# Patient Record
Sex: MALE | Race: WHITE | NOT HISPANIC OR LATINO | Employment: FULL TIME | URBAN - METROPOLITAN AREA
[De-identification: names, ages, dates, MRNs, and addresses within clinical notes are randomized per-mention and may not be internally consistent; named-entity substitution may affect disease eponyms.]

---

## 2023-09-20 PROBLEM — G47.33 OBSTRUCTIVE SLEEP APNEA SYNDROME: Status: ACTIVE | Noted: 2023-09-20

## 2023-09-20 RX ORDER — IBUPROFEN 200 MG
TABLET ORAL
COMMUNITY

## 2023-09-20 RX ORDER — OXYCODONE AND ACETAMINOPHEN 5; 325 MG/1; MG/1
TABLET ORAL
COMMUNITY

## 2023-09-20 RX ORDER — ACETAMINOPHEN 500 MG
TABLET ORAL
COMMUNITY

## 2023-10-06 ENCOUNTER — APPOINTMENT (OUTPATIENT)
Dept: OCCUPATIONAL THERAPY | Facility: CLINIC | Age: 23
End: 2023-10-06
Payer: COMMERCIAL

## 2023-10-11 ENCOUNTER — TREATMENT (OUTPATIENT)
Dept: OCCUPATIONAL THERAPY | Facility: CLINIC | Age: 23
End: 2023-10-11
Payer: COMMERCIAL

## 2023-10-11 DIAGNOSIS — S46.912D SHOULDER STRAIN, LEFT, SUBSEQUENT ENCOUNTER: Primary | ICD-10-CM

## 2023-10-11 PROCEDURE — 97110 THERAPEUTIC EXERCISES: CPT | Mod: GO

## 2023-10-11 NOTE — PROGRESS NOTES
"  Occupational Therapy    Occupational Therapy Treatment    Patient Name: Saman Hillman  MRN: [id number]  Today's Date: 10/11/2023    Plan    Continue with left shoulder PRE's.             Assessment Pt is progressing well. Demonstrated full AROM L shoulder. Fatigue demonstrated with higher level strengthening exercises.    Pain Scale: On a scale of 0 to 10, the patient rates the pain at 0.   Please identify location of pain: left shoulder.   Pain Quality: dull.             Insurance    Insurance reviewed   Visit number: 10  [facility]   Onset Date: 03/ 04/ 2023     DOS: 7/7/2023     Subjective    Patient reports:.   No pain right now. I feel like I'm doing ok but still can feel some pain with certain movements.     Home program performing as directed: Yes.      Objective    ROM/JointMobility:   (Range of Motion in degrees)   Shoulder:   (Key: \" P! \" Denotes Pain with Movement, \" * \" Indicates Gravity Resistant Otherwise Measurements are in Supine)   Flexion: L Active 170.   ABDuction: L Active 170.   External Rotation (ER) ABDuction: L Active 80 deg. ER   Hand up back: L5.     Strength:   Shoulders:   (Key: \"P!\" Denotes Pain with Movement)   External rotation at side 4/5 on left.   Shoulder internal rotation at side 4/5 on left.         Treatment  UBE x 6 min   ER x 15x3 with 2.5 lbs  PNF diagonals x 15x3 with 2.5 lbs  Prone T and Y x 15x2 with 3 lbs on swiss ball   90/90 ER x15x3 with  2.5lbs  BOSU planks CW and CCW  Push up on BOSU x 5x2  "

## 2023-10-18 ENCOUNTER — TREATMENT (OUTPATIENT)
Dept: OCCUPATIONAL THERAPY | Facility: CLINIC | Age: 23
End: 2023-10-18
Payer: COMMERCIAL

## 2023-10-18 DIAGNOSIS — S46.912D SHOULDER STRAIN, LEFT, SUBSEQUENT ENCOUNTER: Primary | ICD-10-CM

## 2023-10-18 PROCEDURE — 97110 THERAPEUTIC EXERCISES: CPT | Mod: GO

## 2023-10-18 ASSESSMENT — PAIN SCALES - GENERAL: PAINLEVEL_OUTOF10: 0 - NO PAIN

## 2023-10-18 ASSESSMENT — PAIN - FUNCTIONAL ASSESSMENT: PAIN_FUNCTIONAL_ASSESSMENT: 0-10

## 2023-10-18 NOTE — PROGRESS NOTES
Occupational Therapy Treatment         Name: Saman Hillman  MRN: [id number]  : 2000  Date: 10/18/23  Time Calculation  Start Time: 1400  Stop Time: 1445  Time Calculation (min): 45 min  Visit #[id number]   Onset Date: 2023     DOS: 2023    Assessment: Pt is progressing well. Demonstrated full AROM and stretngth  L shoulder. Recommend pt is able to return to work      Plan: Pt will follow  up with MD tomorrow.         Subjective It was a little sore after starting the strengthening exercises, but it's good now.   General:        Pain Assessment:  0/10    Objective    Shoulder strength is 5/5 all planes   ROM is WNL's all planes        Treatment  UBE x 6 min   ER x 15x3 with 2.5 lbs  PNF diagonals x 15x3 with red theraband   Prone T and Y x 15x2 with 3 lbs on swiss ball   90/90 ER x15x3 with red   BOSU planks CW and CCW  Push up on BOSU x 5x2

## 2023-10-19 ENCOUNTER — OFFICE VISIT (OUTPATIENT)
Dept: ORTHOPEDIC SURGERY | Facility: HOSPITAL | Age: 23
End: 2023-10-19
Payer: OTHER GOVERNMENT

## 2023-10-19 DIAGNOSIS — S46.912A STRAIN OF UPPER ARM, LEFT, INITIAL ENCOUNTER: Primary | ICD-10-CM

## 2023-10-19 PROCEDURE — 99213 OFFICE O/P EST LOW 20 MIN: CPT | Performed by: ORTHOPAEDIC SURGERY

## 2023-10-19 ASSESSMENT — PAIN SCALES - GENERAL: PAINLEVEL_OUTOF10: 2

## 2023-10-25 ENCOUNTER — TREATMENT (OUTPATIENT)
Dept: OCCUPATIONAL THERAPY | Facility: CLINIC | Age: 23
End: 2023-10-25
Payer: COMMERCIAL

## 2023-10-25 DIAGNOSIS — S46.912D SHOULDER STRAIN, LEFT, SUBSEQUENT ENCOUNTER: Primary | ICD-10-CM

## 2023-10-25 PROCEDURE — 97110 THERAPEUTIC EXERCISES: CPT | Mod: GO

## 2023-10-25 ASSESSMENT — ENCOUNTER SYMPTOMS
SHORTNESS OF BREATH: 0
WOUND: 0
TROUBLE SWALLOWING: 0
WHEEZING: 0
FATIGUE: 0
FEVER: 0
CHILLS: 0

## 2023-10-25 NOTE — PROGRESS NOTES
Occupational Therapy Treatment         Name: aSman Hillman  MRN: [id number]  : 2000  Date: 10/25/23  Time Calculation  Start Time: 1605  Stop Time: 1645  Time Calculation (min): 40 min  Visit #12  BWC   Onset Date: 2023     DOS: 2023    Assessment: Pt is progressing well. Pt was able to tolerate increased loads with no increase in pain.     Plan: Continue to work on strengthening        Subjective My L arm doesn't feel as strong as my right   General:        Pain Assessment:  0/10    Objective    Shoulder strength is 5/5 all planes   ROM is WNL's all planes        Treatment  UBE x 6 min   ER x 15x3 with 2.5 lbs  PNF diagonals x 15x3 with 5 lbs   Prone T and Y x 15x2 with 3 lbs on swiss ball   BOSU planks CW and CCW  TRX  row x 15 x 2   Row x 15x3 with 22 lbs   Lat pull down x 15x3 with 17 lbs

## 2023-10-25 NOTE — PROGRESS NOTES
Reason for Appointment  Slowly improving L shoulder pain       History of Present Illness  Patient is a 23 y.o. male here today for follow-up evaluation 3 and half months status post a left shoulder arthroscopic labral repair and biceps tenodesis.  He is continuing to work in therapy, he is up to about 6 pound weights in therapy but the arm still fatigues quickly.  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  He is not able to return back to work full duty at this point but would like to return back on light duty if possible.  Is important for him to maximize this arm and regain as much strength as possible to reduce his risk of injury.  No other changes in his past medical history, allergies, or medications.    History reviewed. No pertinent past medical history.    History reviewed. No pertinent surgical history.    Medication Documentation Review Audit       Reviewed by Mónica Garcia MA (Medical Assistant) on 10/19/23 at 1010      Medication Order Taking? Sig Documenting Provider Last Dose Status   acetaminophen (Tylenol Extra Strength) 500 mg tablet [id number]  2 tablet oral every six hours PRN pain Historical Provider, MD  Active   ibuprofen (AdviL) 200 mg tablet [id number]  3 tablet oral twice a day PRN pain Historical Provider, MD  Active   oxyCODONE-acetaminophen (Percocet) 5-325 mg tablet [id number]  1 tablet oral every six hours PRN pain Historical Provider, MD  Active                    Allergies   Allergen Reactions    Amoxicillin Hives and Unknown    Other Nausea And Vomiting     Seafood       Review of Systems   Constitutional:  Negative for chills, fatigue and fever.   HENT:  Negative for nosebleeds and trouble swallowing.    Respiratory:  Negative for shortness of breath and wheezing.    Cardiovascular:  Negative for chest pain and leg swelling.   Skin:  Negative for rash and wound.     Exam   On exam the left shoulder shows good active forward flexion over 150 degrees.   He has good cuff strength with resisted external rotation, maybe some mild weakness compared to the opposite side.  Deltoid is functional.  Mild tenderness over the anterior joint line.  Good pulses and sensation in the upper extremity.    Assessment   Left arm strain    Plan   He will continue to work in therapy on regaining strength and he is not able to return back to full duty yet at this point.  He would like to go back on light duty

## 2023-11-01 ENCOUNTER — TREATMENT (OUTPATIENT)
Dept: OCCUPATIONAL THERAPY | Facility: CLINIC | Age: 23
End: 2023-11-01
Payer: COMMERCIAL

## 2023-11-01 DIAGNOSIS — S46.912D SHOULDER STRAIN, LEFT, SUBSEQUENT ENCOUNTER: Primary | ICD-10-CM

## 2023-11-01 PROCEDURE — 97110 THERAPEUTIC EXERCISES: CPT | Mod: GO

## 2023-11-01 NOTE — PROGRESS NOTES
Occupational Therapy                                        Occupational Therapy Treatment         Name: Saman Hillman  MRN: [id number]  : 2000  Date: 23  Time Calculation  Start Time: 1515  Stop Time: 1600  Time Calculation (min): 45 min  Visit #13  BWC   Onset Date: 2023     DOS: 2023    Assessment: Pt is progressing well. Pt was able to tolerate increased loads with no increase in pain.     Plan: Continue to work on strengthening        Subjective When I lift heavy I can feel soreness   General:        Pain Assessment:  0/10    Objective    Shoulder strength is 5/5 all planes   ROM is WNL's all planes        Treatment  UBE x 6 min   ER x 15x3 with 2.5 lbs  PNF diagonals x 15x3 with 5 lbs   Prone T and Y x 15x2 with 3 lbs on swiss ball   Row x 15x3 with 22 lbs   Lat pull down x 15x3 with 17 lbs   Row with 90/90 ER x 15x3 with red theraband

## 2023-11-08 ENCOUNTER — TREATMENT (OUTPATIENT)
Dept: OCCUPATIONAL THERAPY | Facility: CLINIC | Age: 23
End: 2023-11-08
Payer: COMMERCIAL

## 2023-11-08 DIAGNOSIS — S46.912D SHOULDER STRAIN, LEFT, SUBSEQUENT ENCOUNTER: Primary | ICD-10-CM

## 2023-11-08 PROCEDURE — 97110 THERAPEUTIC EXERCISES: CPT | Mod: GO

## 2023-11-08 NOTE — PROGRESS NOTES
Occupational Therapy    Occupational Therapy                                        Occupational Therapy Treatment         Name: Saman Hillman  MRN: [id number]  : 2000  Date: 23  Time Calculation  Start Time: 1030  Stop Time: 1113  Time Calculation (min): 43 min  Visit #14  BWC   Onset Date: 2023     DOS: 2023    Assessment: Pt is progressing well. Continues to report some mild weakness posterior shoulder with overhead movements      Plan: Continue to work on strengthening as tolerated        Subjective It just feels weak when I lift overhead   General:        Pain Assessment:  1/10    Objective    Shoulder strength is 5/5 all planes   ROM is WNL's all planes        Treatment  UBE x 6 min   ER x 15x3 with 2.5 lbs  PNF diagonals x 15x3 with 5 lbs   Prone T and Y x 15x2 with 4 lbs on swiss ball   Row x 15x3 with 22 lbs   Lat pull down x 15x3 with 17 lbs   Row with 90/90 ER x 15x3 with red theraband   TRX bodyweight row x 15x3

## 2023-11-15 ENCOUNTER — TREATMENT (OUTPATIENT)
Dept: OCCUPATIONAL THERAPY | Facility: CLINIC | Age: 23
End: 2023-11-15
Payer: COMMERCIAL

## 2023-11-15 DIAGNOSIS — S46.912D SHOULDER STRAIN, LEFT, SUBSEQUENT ENCOUNTER: Primary | ICD-10-CM

## 2023-11-15 PROCEDURE — 97110 THERAPEUTIC EXERCISES: CPT | Mod: GO

## 2023-11-15 NOTE — PROGRESS NOTES
Occupational Therapy                                        Occupational Therapy Treatment          Name: Saman Hillman  MRN: [id number]  : 2000  Date: 11/15/23  Time Calculation  Start Time: 1347  Stop Time: 1428  Time Calculation (min): 41 min  Visit #15  BWC   Onset Date: 2023     DOS: 2023    Assessment: Pt is progressing well. Continues to increase lifting loads and tolerates well      Plan: Continue to work on strengthening as tolerated        Subjective      General:        Pain Assessment:  1/10    Objective    Shoulder strength is 5/5 all planes   ROM is WNL's all planes        Treatment  UBE x 6 min   Bodyblade x 3 min   Rebounder- chest pass with 9 lb medicine ball  ER x 15x3 with 2.5 lbs  [place] walk with 10 lb KB x 15 ft x 2   PNF diagonals x 15x3 with 7 lbs   Prone T and Y x 15x2 with 4 lbs on swiss ball   Row x 15x3 with 22 lbs   Lat pull down x 15x3 with 22 lbs   Row with 90/90 ER x 15x3 with red theraband   TRX bodyweight row x 15x3

## 2023-12-04 ENCOUNTER — OFFICE VISIT (OUTPATIENT)
Dept: ORTHOPEDIC SURGERY | Facility: CLINIC | Age: 23
End: 2023-12-04
Payer: OTHER GOVERNMENT

## 2023-12-04 DIAGNOSIS — S46.912A STRAIN OF UPPER ARM, LEFT, INITIAL ENCOUNTER: Primary | ICD-10-CM

## 2023-12-04 PROCEDURE — 99213 OFFICE O/P EST LOW 20 MIN: CPT | Performed by: ORTHOPAEDIC SURGERY

## 2023-12-04 ASSESSMENT — ENCOUNTER SYMPTOMS
SHORTNESS OF BREATH: 0
WOUND: 0
TROUBLE SWALLOWING: 0
WHEEZING: 0
CHILLS: 0
FEVER: 0
FATIGUE: 0

## 2023-12-04 NOTE — PROGRESS NOTES
Reason for Appointment  Increased L shoulder pain     History of Present Illness  Patient is a 23 y.o. male here today for a [facility] case follow-up evaluation of continued left shoulder pain.  He is almost 5 months status post left shoulder arthroscopic labral repair and biceps tenodesis. 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  He is still working in therapy but feels like he has plateaued in terms of his progress.  The arm fatigues very quickly and we will need to modify his restrictions at this point to reduce his risk of reinjury.  No other changes in his past medical history, allergies, or medications.    History reviewed. No pertinent past medical history.    History reviewed. No pertinent surgical history.    Medication Documentation Review Audit       Reviewed by Aysha Garrison PA-C (Physician Assistant) on 12/04/23 at 1013      Medication Order Taking? Sig Documenting Provider Last Dose Status   acetaminophen (Tylenol Extra Strength) 500 mg tablet [id number] Yes 2 tablet oral every six hours PRN pain Historical Provider, MD Taking Active   ibuprofen (AdviL) 200 mg tablet [id number] Yes 3 tablet oral twice a day PRN pain Historical Provider, MD Taking Active   oxyCODONE-acetaminophen (Percocet) 5-325 mg tablet [id number] Yes 1 tablet oral every six hours PRN pain Historical Provider, MD Taking Active                    Allergies   Allergen Reactions    Amoxicillin Hives and Unknown    Other Nausea And Vomiting     Seafood       Review of Systems   Constitutional:  Negative for chills, fatigue and fever.   HENT:  Negative for hearing loss and trouble swallowing.    Respiratory:  Negative for shortness of breath and wheezing.    Cardiovascular:  Negative for chest pain and leg swelling.   Skin:  Negative for rash and wound.     Exam   On exam left shoulder shows good active forward flexion, no gross instability of the shoulder. Good cuff  strength with resisted external rotation, deltoid is functional.  Again, mild joint line tenderness.  Good pulses and sensation in the upper extremity.    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  It is important for him not to for work this arm to reduce his risk of reinjury.  We will also place a C9 for work hardening to increase strength and endurance in that arm.  We will follow-up with him in 6 weeks.    Written by Aysha Garrison PA-C

## 2023-12-07 ENCOUNTER — APPOINTMENT (OUTPATIENT)
Dept: ORTHOPEDIC SURGERY | Facility: HOSPITAL | Age: 23
End: 2023-12-07
Payer: COMMERCIAL

## 2023-12-21 ENCOUNTER — OFFICE VISIT (OUTPATIENT)
Dept: ORTHOPEDIC SURGERY | Facility: HOSPITAL | Age: 23
End: 2023-12-21
Payer: OTHER GOVERNMENT

## 2023-12-21 DIAGNOSIS — S46.912A STRAIN OF UPPER ARM, LEFT, INITIAL ENCOUNTER: Primary | ICD-10-CM

## 2023-12-21 PROCEDURE — 99213 OFFICE O/P EST LOW 20 MIN: CPT | Performed by: ORTHOPAEDIC SURGERY

## 2023-12-21 ASSESSMENT — PAIN SCALES - GENERAL: PAINLEVEL_OUTOF10: 0 - NO PAIN

## 2023-12-21 ASSESSMENT — PAIN - FUNCTIONAL ASSESSMENT: PAIN_FUNCTIONAL_ASSESSMENT: 0-10

## 2023-12-22 ASSESSMENT — ENCOUNTER SYMPTOMS
FEVER: 0
TROUBLE SWALLOWING: 0
COLOR CHANGE: 0
SHORTNESS OF BREATH: 0
WHEEZING: 0
CHILLS: 0
FATIGUE: 0

## 2023-12-22 NOTE — PROGRESS NOTES
Reason for Appointment  Slowly improving left shoulder pain    History of Present Illness  Patient is a 23 y.o. male here today for a [facility] case follow-up evaluation of slowly improving left shoulder pain.  He is 5 months out status post a left shoulder arthroscopic labral pair and biceps tenodesis.  Endurance and strength in that arm is slowly returning.  Has been recently approved for work conditioning and will start that after the first of the year.  He is currently working with restrictions.  No other changes in his past medical history, allergies, or medications.    History reviewed. No pertinent past medical history.    History reviewed. No pertinent surgical history.    Medication Documentation Review Audit       Reviewed by Mónica Garcia MA (Medical Assistant) on 12/21/23 at 1448      Medication Order Taking? Sig Documenting Provider Last Dose Status   acetaminophen (Tylenol Extra Strength) 500 mg tablet [id number] Yes 2 tablet oral every six hours PRN pain Historical Provider, MD Taking Active   ibuprofen (AdviL) 200 mg tablet [id number] Yes 3 tablet oral twice a day PRN pain Historical Provider, MD Taking Active   oxyCODONE-acetaminophen (Percocet) 5-325 mg tablet [id number] Yes 1 tablet oral every six hours PRN pain Historical Provider, MD Taking Active                    Allergies   Allergen Reactions    Amoxicillin Hives and Unknown    Other Nausea And Vomiting     Seafood       Review of Systems   Constitutional:  Negative for chills, fatigue and fever.   HENT:  Negative for nosebleeds and trouble swallowing.    Respiratory:  Negative for shortness of breath and wheezing.    Cardiovascular:  Negative for chest pain and leg swelling.   Skin:  Negative for color change and pallor.     Exam   On exam the left shoulder shows good active forward flexion over 150 degrees.  No stiffness with internal and external rotation.  Mildly positive impingement signs on the left.  Deltoid is functional.  Good pulses  and sensation in the upper extremity.    Assessment   Left shoulder strain    Plan   He will begin work conditioning after the beginning of the year.  This is a needed to regain endurance and strength back in that arm to reduce his risk of reinjury as he must go back to a very physical job as a . We will keep his current restrictions for the next 2 months and reassess him at that point to change his restrictions.    Written by Aysha Garrison PA-C

## 2024-02-06 DIAGNOSIS — S46.912D SHOULDER STRAIN, LEFT, SUBSEQUENT ENCOUNTER: Primary | ICD-10-CM

## 2024-02-19 ENCOUNTER — OFFICE VISIT (OUTPATIENT)
Dept: ORTHOPEDIC SURGERY | Facility: CLINIC | Age: 24
End: 2024-02-19
Payer: OTHER GOVERNMENT

## 2024-02-19 DIAGNOSIS — S46.912A STRAIN OF UPPER ARM, LEFT, INITIAL ENCOUNTER: Primary | ICD-10-CM

## 2024-02-19 PROCEDURE — 99213 OFFICE O/P EST LOW 20 MIN: CPT | Performed by: ORTHOPAEDIC SURGERY

## 2024-02-19 ASSESSMENT — PAIN SCALES - GENERAL: PAINLEVEL_OUTOF10: 0 - NO PAIN

## 2024-02-19 ASSESSMENT — ENCOUNTER SYMPTOMS
SHORTNESS OF BREATH: 0
FATIGUE: 0
WHEEZING: 0
FEVER: 0
ARTHRALGIAS: 1
CHILLS: 0

## 2024-02-19 ASSESSMENT — PAIN - FUNCTIONAL ASSESSMENT: PAIN_FUNCTIONAL_ASSESSMENT: 0-10

## 2024-02-19 NOTE — PROGRESS NOTES
Reason for Appointment  Chief Complaint   Patient presents with    Left Shoulder - Follow-up     History of Present Illness  DOL patient is a 23 y.o. male here today for follow-up evaluation of his left shoulder. He is about 7 months s/p left shoulder labral repair and biceps tenodesis. He has been to two work conditioning sessions. He would like to start working more. He did notice that his arm went numb while walking around with a heavy tool kit. No other updates to PMH, allergies, or medications.     History reviewed. No pertinent past medical history.    History reviewed. No pertinent surgical history.    Medication Documentation Review Audit       Reviewed by Mónica Garcia MA (Medical Assistant) on 02/19/24 at 1524      Medication Order Taking? Sig Documenting Provider Last Dose Status   acetaminophen (Tylenol Extra Strength) 500 mg tablet [id number] Yes 2 tablet oral every six hours PRN pain Historical Provider, MD Taking Active   ibuprofen (AdviL) 200 mg tablet [id number] Yes 3 tablet oral twice a day PRN pain Historical Provider, MD Taking Active   oxyCODONE-acetaminophen (Percocet) 5-325 mg tablet [id number] Yes 1 tablet oral every six hours PRN pain Historical Provider, MD Taking Active                    Allergies   Allergen Reactions    Amoxicillin Hives and Unknown    Other Nausea And Vomiting     Seafood       Review of Systems   Constitutional:  Negative for chills, fatigue and fever.   Respiratory:  Negative for shortness of breath and wheezing.    Cardiovascular:  Negative for chest pain and leg swelling.   Musculoskeletal:  Positive for arthralgias.   All other systems reviewed and are negative.      Exam   On exam of the left arm, there is excellent ROM with minimal loss of forward flexion, good internal and external rotation, good cuff strength, good biceps strength,     Assessment   Encounter Diagnosis   Name Primary?    Strain of upper arm, left, initial encounter Yes       Plan   He has been  doing well enough that we can stop work conditioning and he can return to work under increased restrictions.  The best work conditioning for him will be increased walking on his job. Follow-up in 2 months.       I, Koki Quinones, attest that this documentation has been prepared under the direction and in the presence of Diomedes Aldridge MD. By signing below, I, Diomedes Aldridge MD, personally performed the services described in this documentation. All medical record entries made by the scribe were at my direction and in my presence. I have reviewed the chart and agree that the record reflects my personal performance and is accurate and complete. 02/19/24

## 2024-03-18 ENCOUNTER — OFFICE VISIT (OUTPATIENT)
Dept: ORTHOPEDIC SURGERY | Facility: CLINIC | Age: 24
End: 2024-03-18
Payer: OTHER GOVERNMENT

## 2024-03-18 DIAGNOSIS — S46.912A STRAIN OF UPPER ARM, LEFT, INITIAL ENCOUNTER: Primary | ICD-10-CM

## 2024-03-18 PROCEDURE — 99213 OFFICE O/P EST LOW 20 MIN: CPT | Performed by: ORTHOPAEDIC SURGERY

## 2024-03-18 ASSESSMENT — ENCOUNTER SYMPTOMS
SHORTNESS OF BREATH: 0
FATIGUE: 0
FEVER: 0
WHEEZING: 0
CHILLS: 0

## 2024-03-18 ASSESSMENT — PAIN SCALES - GENERAL: PAINLEVEL_OUTOF10: 0 - NO PAIN

## 2024-03-18 ASSESSMENT — PAIN - FUNCTIONAL ASSESSMENT: PAIN_FUNCTIONAL_ASSESSMENT: 0-10

## 2024-03-18 NOTE — PROGRESS NOTES
Reason for Appointment  Chief Complaint   Patient presents with    Left Shoulder - Follow-up     History of Present Illness  DOL patient is a 23 y.o. male here today for follow-up evaluation of his left shoulder. He is 9 months s/p left shoulder labral repair and biceps tenodesis.  Since his last visit, he stopped work conditioning and has returned to work under increased restrictions. He is back to working a full days and is under a new route which takes stress off his shoulder. He does not have any significant pain today. No other updates to PMH, allergies, or medications.     History reviewed. No pertinent past medical history.    History reviewed. No pertinent surgical history.    Medication Documentation Review Audit       Reviewed by Mónica Garcia MA (Medical Assistant) on 03/18/24 at 1511      Medication Order Taking? Sig Documenting Provider Last Dose Status   acetaminophen (Tylenol Extra Strength) 500 mg tablet [id number] Yes 2 tablet oral every six hours PRN pain Historical Provider, MD Taking Active   ibuprofen (AdviL) 200 mg tablet [id number] Yes 3 tablet oral twice a day PRN pain Historical Provider, MD Taking Active   oxyCODONE-acetaminophen (Percocet) 5-325 mg tablet [id number] Yes 1 tablet oral every six hours PRN pain Historical Provider, MD Taking Active                    Allergies   Allergen Reactions    Amoxicillin Hives and Unknown    Other Nausea And Vomiting     Seafood       Review of Systems   Constitutional:  Negative for chills, fatigue and fever.   Respiratory:  Negative for shortness of breath and wheezing.    Cardiovascular:  Negative for chest pain and leg swelling.   All other systems reviewed and are negative.    Exam   On exam of the left arm, he has regained full ROM of the shoulder, good deltoid function, excellent cuff strength in internal and external rotation, good pulses, good sensation    Assessment   Encounter Diagnosis   Name Primary?    Strain of upper arm, left,  initial encounter Yes     Plan   He has done very well with basic work conditioning on the job. He should continue working under the same conditions. If he has any issues, he will call us. Follow-up in four months.     I, Koki Quinones, attest that this documentation has been prepared under the direction and in the presence of Diomedes Aldridge MD. By signing below, I, Diomedes Aldridge MD, personally performed the services described in this documentation. All medical record entries made by the scribe were at my direction and in my presence. I have reviewed the chart and agree that the record reflects my personal performance and is accurate and complete. 03/18/24

## 2024-05-07 NOTE — PROGRESS NOTES
Discharge Summary    Name: Saman Hillman  MRN: [id number]  : 2000  Date: 23    Discharge Summary: OT    Discharge Information: Date of discharge 11/15/2023, Date of last visit 11/15/2023, Date of evaluation 2023, Number of attended visits 15, Referred by Dr. Aldridge, and Referred for L shoulder labral repair and biceps tenodesis    Therapy Summary: Shoulder strength is 5/5 all planes   ROM is WNL's all planes        Rehab Discharge Reason: Achieved all and/or the most significant goals(s)

## 2024-07-15 ENCOUNTER — APPOINTMENT (OUTPATIENT)
Dept: ORTHOPEDIC SURGERY | Facility: CLINIC | Age: 24
End: 2024-07-15
Payer: OTHER GOVERNMENT

## 2024-07-29 ENCOUNTER — APPOINTMENT (OUTPATIENT)
Dept: ORTHOPEDIC SURGERY | Facility: CLINIC | Age: 24
End: 2024-07-29
Payer: OTHER GOVERNMENT

## 2024-07-29 DIAGNOSIS — S46.912A STRAIN OF LEFT SHOULDER, INITIAL ENCOUNTER: Primary | ICD-10-CM

## 2024-07-29 PROCEDURE — 99213 OFFICE O/P EST LOW 20 MIN: CPT | Performed by: ORTHOPAEDIC SURGERY

## 2024-07-29 ASSESSMENT — PAIN - FUNCTIONAL ASSESSMENT: PAIN_FUNCTIONAL_ASSESSMENT: 0-10

## 2024-07-29 ASSESSMENT — PAIN SCALES - GENERAL: PAINLEVEL_OUTOF10: 0 - NO PAIN

## 2024-07-29 NOTE — PROGRESS NOTES
Reason for Appointment  Chief Complaint   Patient presents with    Left Shoulder - Follow-up     History of Present Illness  Patient is a 23 y.o. male here today for follow-up evaluation of excellent improvement left shoulder with minimal pain, he has been doing well at this juncture over a year out from his left shoulder labral repair and biceps tenodesis, only issue he has is an ingrown hair and one of the small incisions that he can cleanse and remove the hair with tweezers as it bothers him any significant problems at the incision site or issues let us know but looks excellent today.  Does not have any instability findings but gets some fatigue in the shoulder with too much work no falls or injuries no other changes in his past medical history allergies medications    History reviewed. No pertinent past medical history.    History reviewed. No pertinent surgical history.    Medication Documentation Review Audit       Reviewed by Diomedes Aldridge MD (Physician) on 07/29/24 at 1441      Medication Order Taking? Sig Documenting Provider Last Dose Status   acetaminophen (Tylenol Extra Strength) 500 mg tablet [id number] Yes 2 tablet oral every six hours PRN pain Historical Provider, MD Taking Active   ibuprofen (AdviL) 200 mg tablet [id number] Yes 3 tablet oral twice a day PRN pain Historical Provider, MD Taking Active   oxyCODONE-acetaminophen (Percocet) 5-325 mg tablet [id number] Yes 1 tablet oral every six hours PRN pain Historical Provider, MD Taking Active                    Allergies   Allergen Reactions    Amoxicillin Hives and Unknown    Other Nausea And Vomiting     Seafood       Review of Systems  Unchanged  Exam   Exam shows full range of motion with well-healed incisions some mild hypertrophic scars but nothing significant no instability findings full range of motion good rotator cuff strength good deltoid strength good distal pulses and sensation  Assessment     Strain of left shoulder status post  repair  Plan   At this juncture I will see him back in 6 months, he understands to continually do his stretching and strengthening program and avoid improper lifting, follow-up 6 months

## 2024-09-19 ENCOUNTER — LAB (OUTPATIENT)
Dept: LAB | Facility: LAB | Age: 24
End: 2024-09-19
Payer: COMMERCIAL

## 2024-09-19 DIAGNOSIS — Z84.89 FAMILY HISTORY OF OTHER SPECIFIED CONDITIONS: Primary | ICD-10-CM

## 2024-09-26 LAB
COMMENTS - MP RESULT TYPE: NORMAL
SCAN RESULT: NORMAL

## 2025-02-03 ENCOUNTER — APPOINTMENT (OUTPATIENT)
Dept: ORTHOPEDIC SURGERY | Facility: CLINIC | Age: 25
End: 2025-02-03
Payer: OTHER GOVERNMENT